# Patient Record
Sex: FEMALE | Race: WHITE | NOT HISPANIC OR LATINO | Employment: OTHER | URBAN - NONMETROPOLITAN AREA
[De-identification: names, ages, dates, MRNs, and addresses within clinical notes are randomized per-mention and may not be internally consistent; named-entity substitution may affect disease eponyms.]

---

## 2017-03-01 ENCOUNTER — OFFICE VISIT (OUTPATIENT)
Dept: FAMILY MEDICINE CLINIC | Facility: CLINIC | Age: 77
End: 2017-03-01

## 2017-03-01 VITALS
WEIGHT: 152 LBS | SYSTOLIC BLOOD PRESSURE: 112 MMHG | DIASTOLIC BLOOD PRESSURE: 58 MMHG | TEMPERATURE: 98.3 F | HEART RATE: 80 BPM | HEIGHT: 63 IN | BODY MASS INDEX: 26.93 KG/M2 | RESPIRATION RATE: 16 BRPM | OXYGEN SATURATION: 98 %

## 2017-03-01 DIAGNOSIS — J32.9 SINUSITIS, UNSPECIFIED CHRONICITY, UNSPECIFIED LOCATION: Primary | ICD-10-CM

## 2017-03-01 PROCEDURE — 99213 OFFICE O/P EST LOW 20 MIN: CPT | Performed by: FAMILY MEDICINE

## 2017-03-01 PROCEDURE — 96372 THER/PROPH/DIAG INJ SC/IM: CPT | Performed by: FAMILY MEDICINE

## 2017-03-01 RX ORDER — DEXAMETHASONE SODIUM PHOSPHATE 10 MG/ML
10 INJECTION INTRAMUSCULAR; INTRAVENOUS ONCE
Status: DISCONTINUED | OUTPATIENT
Start: 2017-03-01 | End: 2017-03-01

## 2017-03-01 RX ORDER — CEFDINIR 300 MG/1
300 CAPSULE ORAL 2 TIMES DAILY
Qty: 20 CAPSULE | Refills: 0 | Status: SHIPPED | OUTPATIENT
Start: 2017-03-01 | End: 2017-12-01

## 2017-03-01 RX ORDER — DEXAMETHASONE SODIUM PHOSPHATE 10 MG/ML
10 INJECTION INTRAMUSCULAR; INTRAVENOUS ONCE
Status: COMPLETED | OUTPATIENT
Start: 2017-03-01 | End: 2017-03-01

## 2017-03-01 RX ORDER — VALACYCLOVIR HYDROCHLORIDE 1 G/1
1000 TABLET, FILM COATED ORAL 2 TIMES DAILY
Qty: 30 TABLET | Refills: 0 | Status: SHIPPED | OUTPATIENT
Start: 2017-03-01 | End: 2017-12-01

## 2017-03-01 RX ADMIN — DEXAMETHASONE SODIUM PHOSPHATE 10 MG: 10 INJECTION INTRAMUSCULAR; INTRAVENOUS at 14:28

## 2017-03-01 NOTE — PROGRESS NOTES
Subjective   Jennifer Porter is a 76 y.o. female.   Chief Complaint   Patient presents with   • URI     been going on about 1 month    • Slow Heart Rate     heart rate ran in the 40's all last week        URI    This is a new problem. The current episode started 1 to 4 weeks ago. The problem has been waxing and waning. Maximum temperature: subj. Associated symptoms include congestion, coughing, a plugged ear sensation, rhinorrhea and a sore throat. She has tried nothing for the symptoms.        The following portions of the patient's history were reviewed and updated as appropriate: allergies, current medications, past family history, past medical history, past social history, past surgical history and problem list.    Review of Systems   Constitutional: Negative.    HENT: Positive for congestion, rhinorrhea and sore throat.    Eyes: Negative.    Respiratory: Positive for cough.    Cardiovascular: Negative.    Gastrointestinal: Negative.    Endocrine: Negative.    Genitourinary: Negative.    Musculoskeletal: Negative.    Skin: Negative.    Allergic/Immunologic: Negative.    Neurological: Negative.    Hematological: Negative.    Psychiatric/Behavioral: Negative.    All other systems reviewed and are negative.      Objective   Physical Exam   Constitutional: She is oriented to person, place, and time. She appears well-developed and well-nourished.   HENT:   Head: Normocephalic and atraumatic.   Right Ear: External ear normal.   Left Ear: External ear normal.   Nose: Mucosal edema and rhinorrhea present.   Mouth/Throat: Posterior oropharyngeal edema and posterior oropharyngeal erythema present.   Eyes: Conjunctivae and EOM are normal. Pupils are equal, round, and reactive to light.   Neck: Normal range of motion. Neck supple.   Cardiovascular: Normal rate, regular rhythm, normal heart sounds and intact distal pulses.  Exam reveals no gallop and no friction rub.    No murmur heard.  Pulmonary/Chest: Effort normal  and breath sounds normal. She has no wheezes. She has no rales.   Abdominal: Soft. Bowel sounds are normal. She exhibits no mass. There is no tenderness. There is no rebound and no guarding.   Musculoskeletal: Normal range of motion.   Neurological: She is alert and oriented to person, place, and time. She has normal reflexes. No cranial nerve deficit. She exhibits normal muscle tone.   Skin: Skin is warm and dry. No rash noted.   Psychiatric: She has a normal mood and affect. Her behavior is normal. Judgment and thought content normal.   Nursing note and vitals reviewed.      Assessment/Plan   Jennifer was seen today for uri and slow heart rate.    Diagnoses and all orders for this visit:    Sinusitis, unspecified chronicity, unspecified location  -     dexamethasone (DECADRON) injection 10 mg; Infuse 1 mL into a venous catheter 1 (One) Time.    Other orders  -     cefdinir (OMNICEF) 300 MG capsule; Take 1 capsule by mouth 2 (Two) Times a Day.  -     valACYclovir (VALTREX) 1000 MG tablet; Take 1 tablet by mouth 2 (Two) Times a Day.

## 2017-11-06 RX ORDER — BENAZEPRIL HYDROCHLORIDE 10 MG/1
TABLET ORAL
Qty: 30 TABLET | Refills: 0 | Status: SHIPPED | OUTPATIENT
Start: 2017-11-06 | End: 2017-12-01 | Stop reason: SDUPTHER

## 2017-11-06 RX ORDER — BISOPROLOL FUMARATE 5 MG/1
TABLET, FILM COATED ORAL
Qty: 30 TABLET | Refills: 0 | Status: SHIPPED | OUTPATIENT
Start: 2017-11-06 | End: 2017-12-01 | Stop reason: SDUPTHER

## 2017-11-06 RX ORDER — AMLODIPINE BESYLATE 10 MG/1
TABLET ORAL
Qty: 30 TABLET | Refills: 0 | Status: SHIPPED | OUTPATIENT
Start: 2017-11-06 | End: 2017-12-01 | Stop reason: SDUPTHER

## 2017-12-01 ENCOUNTER — OFFICE VISIT (OUTPATIENT)
Dept: FAMILY MEDICINE CLINIC | Facility: CLINIC | Age: 77
End: 2017-12-01

## 2017-12-01 ENCOUNTER — LAB (OUTPATIENT)
Dept: LAB | Facility: OTHER | Age: 77
End: 2017-12-01

## 2017-12-01 VITALS
SYSTOLIC BLOOD PRESSURE: 162 MMHG | TEMPERATURE: 97.6 F | WEIGHT: 150 LBS | HEIGHT: 63 IN | BODY MASS INDEX: 26.58 KG/M2 | DIASTOLIC BLOOD PRESSURE: 80 MMHG | RESPIRATION RATE: 20 BRPM | OXYGEN SATURATION: 98 % | HEART RATE: 88 BPM

## 2017-12-01 DIAGNOSIS — R73.9 HYPERGLYCEMIA: ICD-10-CM

## 2017-12-01 DIAGNOSIS — F32.9 REACTIVE DEPRESSION: ICD-10-CM

## 2017-12-01 DIAGNOSIS — I10 ESSENTIAL HYPERTENSION: ICD-10-CM

## 2017-12-01 DIAGNOSIS — I10 ESSENTIAL HYPERTENSION: Primary | ICD-10-CM

## 2017-12-01 DIAGNOSIS — E78.5 HYPERLIPIDEMIA, UNSPECIFIED HYPERLIPIDEMIA TYPE: ICD-10-CM

## 2017-12-01 DIAGNOSIS — D64.9 ANEMIA, UNSPECIFIED TYPE: ICD-10-CM

## 2017-12-01 LAB
ALBUMIN SERPL-MCNC: 4 G/DL (ref 3.2–5.5)
ALBUMIN/GLOB SERPL: 1.1 G/DL (ref 1–3)
ALP SERPL-CCNC: 52 U/L (ref 15–121)
ALT SERPL W P-5'-P-CCNC: 16 U/L (ref 10–60)
ANION GAP SERPL CALCULATED.3IONS-SCNC: 10 MMOL/L (ref 5–15)
AST SERPL-CCNC: 23 U/L (ref 10–60)
BASOPHILS # BLD AUTO: 0.03 10*3/MM3 (ref 0–0.2)
BASOPHILS NFR BLD AUTO: 0.4 % (ref 0–2)
BILIRUB SERPL-MCNC: 0.8 MG/DL (ref 0.2–1)
BUN BLD-MCNC: 20 MG/DL (ref 8–25)
BUN/CREAT SERPL: 14.3 (ref 7–25)
CALCIUM SPEC-SCNC: 9.2 MG/DL (ref 8.4–10.8)
CHLORIDE SERPL-SCNC: 106 MMOL/L (ref 100–112)
CHOLEST SERPL-MCNC: 206 MG/DL (ref 150–200)
CO2 SERPL-SCNC: 25 MMOL/L (ref 20–32)
CREAT BLD-MCNC: 1.4 MG/DL (ref 0.4–1.3)
DEPRECATED RDW RBC AUTO: 41.9 FL (ref 36.4–46.3)
EOSINOPHIL # BLD AUTO: 0.17 10*3/MM3 (ref 0–0.7)
EOSINOPHIL NFR BLD AUTO: 2.5 % (ref 0–7)
ERYTHROCYTE [DISTWIDTH] IN BLOOD BY AUTOMATED COUNT: 12.6 % (ref 11.5–14.5)
GFR SERPL CREATININE-BSD FRML MDRD: 37 ML/MIN/1.73 (ref 39–90)
GLOBULIN UR ELPH-MCNC: 3.8 GM/DL (ref 2.5–4.6)
GLUCOSE BLD-MCNC: 111 MG/DL (ref 70–100)
HCT VFR BLD AUTO: 39 % (ref 35–45)
HDLC SERPL-MCNC: 57 MG/DL (ref 35–100)
HGB BLD-MCNC: 12.6 G/DL (ref 12–15.5)
LDLC SERPL CALC-MCNC: 126 MG/DL
LDLC/HDLC SERPL: 2.21 {RATIO}
LYMPHOCYTES # BLD AUTO: 1.94 10*3/MM3 (ref 0.6–4.2)
LYMPHOCYTES NFR BLD AUTO: 28.9 % (ref 10–50)
MCH RBC QN AUTO: 30.1 PG (ref 26.5–34)
MCHC RBC AUTO-ENTMCNC: 32.3 G/DL (ref 31.4–36)
MCV RBC AUTO: 93.3 FL (ref 80–98)
MONOCYTES # BLD AUTO: 0.78 10*3/MM3 (ref 0–0.9)
MONOCYTES NFR BLD AUTO: 11.6 % (ref 0–12)
NEUTROPHILS # BLD AUTO: 3.79 10*3/MM3 (ref 2–8.6)
NEUTROPHILS NFR BLD AUTO: 56.6 % (ref 37–80)
PLATELET # BLD AUTO: 314 10*3/MM3 (ref 150–450)
PMV BLD AUTO: 9.3 FL (ref 8–12)
POTASSIUM BLD-SCNC: 4.3 MMOL/L (ref 3.4–5.4)
PROT SERPL-MCNC: 7.8 G/DL (ref 6.7–8.2)
RBC # BLD AUTO: 4.18 10*6/MM3 (ref 3.77–5.16)
SODIUM BLD-SCNC: 141 MMOL/L (ref 134–146)
TRIGL SERPL-MCNC: 114 MG/DL (ref 35–160)
TSH SERPL DL<=0.05 MIU/L-ACNC: 6.11 MIU/ML (ref 0.46–4.68)
VLDLC SERPL-MCNC: 22.8 MG/DL
WBC NRBC COR # BLD: 6.71 10*3/MM3 (ref 3.2–9.8)

## 2017-12-01 PROCEDURE — 80053 COMPREHEN METABOLIC PANEL: CPT | Performed by: FAMILY MEDICINE

## 2017-12-01 PROCEDURE — 84443 ASSAY THYROID STIM HORMONE: CPT | Performed by: FAMILY MEDICINE

## 2017-12-01 PROCEDURE — 36415 COLL VENOUS BLD VENIPUNCTURE: CPT | Performed by: FAMILY MEDICINE

## 2017-12-01 PROCEDURE — 85025 COMPLETE CBC W/AUTO DIFF WBC: CPT | Performed by: FAMILY MEDICINE

## 2017-12-01 PROCEDURE — 99214 OFFICE O/P EST MOD 30 MIN: CPT | Performed by: FAMILY MEDICINE

## 2017-12-01 PROCEDURE — 80061 LIPID PANEL: CPT | Performed by: FAMILY MEDICINE

## 2017-12-01 RX ORDER — BENAZEPRIL HYDROCHLORIDE 10 MG/1
10 TABLET ORAL DAILY
Qty: 90 TABLET | Refills: 1 | Status: SHIPPED | OUTPATIENT
Start: 2017-12-01 | End: 2018-07-20 | Stop reason: SDUPTHER

## 2017-12-01 RX ORDER — BISOPROLOL FUMARATE 5 MG/1
5 TABLET, FILM COATED ORAL DAILY
Qty: 90 TABLET | Refills: 1 | Status: SHIPPED | OUTPATIENT
Start: 2017-12-01 | End: 2018-07-20 | Stop reason: SDUPTHER

## 2017-12-01 RX ORDER — AMLODIPINE BESYLATE 10 MG/1
10 TABLET ORAL DAILY
Qty: 90 TABLET | Refills: 1 | Status: SHIPPED | OUTPATIENT
Start: 2017-12-01 | End: 2018-07-20 | Stop reason: SDUPTHER

## 2017-12-01 RX ORDER — SERTRALINE HYDROCHLORIDE 25 MG/1
25 TABLET, FILM COATED ORAL DAILY
Qty: 30 TABLET | Refills: 6 | Status: SHIPPED | OUTPATIENT
Start: 2017-12-01 | End: 2018-07-20 | Stop reason: SINTOL

## 2017-12-01 RX ORDER — CETIRIZINE HYDROCHLORIDE 10 MG/1
10 TABLET ORAL DAILY
Qty: 30 TABLET | Refills: 3 | Status: SHIPPED | OUTPATIENT
Start: 2017-12-01 | End: 2018-07-20 | Stop reason: SDUPTHER

## 2017-12-01 NOTE — PROGRESS NOTES
Subjective   Jennifer Porter is a 77 y.o. female.   Chief Complaint   Patient presents with   • Hypertension     needs refills        Hypertension   This is a chronic problem. The current episode started more than 1 year ago. The problem is unchanged. The problem is controlled. Associated symptoms include anxiety and malaise/fatigue. Pertinent negatives include no blurred vision, chest pain or headaches. There are no associated agents to hypertension. Risk factors for coronary artery disease include dyslipidemia and sedentary lifestyle. Past treatments include ACE inhibitors, beta blockers and calcium channel blockers. The current treatment provides significant improvement. Compliance problems: ran out of meds.         The following portions of the patient's history were reviewed and updated as appropriate: allergies, current medications, past family history, past medical history, past social history, past surgical history and problem list.    Review of Systems   Constitutional: Positive for fatigue and malaise/fatigue.   HENT: Positive for congestion.    Eyes: Negative.  Negative for blurred vision.   Respiratory: Negative.  Negative for chest tightness.    Cardiovascular: Negative.  Negative for chest pain.   Gastrointestinal: Negative.    Endocrine: Negative.    Genitourinary: Negative.    Musculoskeletal: Negative.    Skin: Negative.    Allergic/Immunologic: Negative.    Neurological: Positive for dizziness. Negative for headaches.   Hematological: Negative.    Psychiatric/Behavioral: Positive for dysphoric mood. The patient is nervous/anxious.    All other systems reviewed and are negative.      Objective   Physical Exam   Constitutional: She is oriented to person, place, and time. She appears well-developed and well-nourished.   HENT:   Head: Normocephalic and atraumatic.   Right Ear: External ear normal.   Left Ear: External ear normal.   Nose: Mucosal edema and rhinorrhea present.   Mouth/Throat:  Posterior oropharyngeal edema and posterior oropharyngeal erythema present.   Eyes: Conjunctivae and EOM are normal. Pupils are equal, round, and reactive to light.   Neck: Normal range of motion. Neck supple.   Cardiovascular: Normal rate, regular rhythm, normal heart sounds and intact distal pulses.  Exam reveals no gallop and no friction rub.    No murmur heard.  Pulmonary/Chest: Effort normal and breath sounds normal. She has no wheezes. She has no rales.   Abdominal: Soft. Bowel sounds are normal. She exhibits no mass. There is no tenderness. There is no rebound and no guarding.   Musculoskeletal: Normal range of motion.   Neurological: She is alert and oriented to person, place, and time. She has normal reflexes. No cranial nerve deficit. She exhibits normal muscle tone.   Skin: Skin is warm and dry. No rash noted.   Psychiatric: Her behavior is normal. Judgment and thought content normal. Her mood appears anxious.   Nursing note and vitals reviewed.      Assessment/Plan   Jennifer was seen today for hypertension.    Diagnoses and all orders for this visit:    Essential hypertension  -     CBC & Differential; Future  -     Comprehensive Metabolic Panel; Future  -     Lipid Panel; Future  -     TSH; Future    Hyperlipidemia, unspecified hyperlipidemia type    Hyperglycemia    Anemia, unspecified type    Reactive depression    Other orders  -     amLODIPine (NORVASC) 10 MG tablet; Take 1 tablet by mouth Daily.  -     benazepril (LOTENSIN) 10 MG tablet; Take 1 tablet by mouth Daily.  -     bisoprolol (ZEBeta) 5 MG tablet; Take 1 tablet by mouth Daily.  -     cetirizine (zyrTEC) 10 MG tablet; Take 1 tablet by mouth Daily.  -     sertraline (ZOLOFT) 25 MG tablet; Take 1 tablet by mouth Daily.

## 2017-12-06 ENCOUNTER — OFFICE VISIT (OUTPATIENT)
Dept: FAMILY MEDICINE CLINIC | Facility: CLINIC | Age: 77
End: 2017-12-06

## 2017-12-06 VITALS
HEART RATE: 65 BPM | DIASTOLIC BLOOD PRESSURE: 78 MMHG | TEMPERATURE: 97.6 F | OXYGEN SATURATION: 96 % | BODY MASS INDEX: 25.92 KG/M2 | RESPIRATION RATE: 16 BRPM | HEIGHT: 65 IN | SYSTOLIC BLOOD PRESSURE: 150 MMHG | WEIGHT: 155.6 LBS

## 2017-12-06 DIAGNOSIS — F32.9 REACTIVE DEPRESSION: ICD-10-CM

## 2017-12-06 DIAGNOSIS — E78.01 FAMILIAL HYPERCHOLESTEROLEMIA: ICD-10-CM

## 2017-12-06 DIAGNOSIS — R73.9 HYPERGLYCEMIA: ICD-10-CM

## 2017-12-06 DIAGNOSIS — R06.02 SHORTNESS OF BREATH: ICD-10-CM

## 2017-12-06 DIAGNOSIS — I49.9 CARDIAC ARRHYTHMIA, UNSPECIFIED CARDIAC ARRHYTHMIA TYPE: ICD-10-CM

## 2017-12-06 DIAGNOSIS — D64.9 ANEMIA, UNSPECIFIED TYPE: ICD-10-CM

## 2017-12-06 DIAGNOSIS — E03.9 ACQUIRED HYPOTHYROIDISM: ICD-10-CM

## 2017-12-06 DIAGNOSIS — I10 ESSENTIAL HYPERTENSION: Primary | ICD-10-CM

## 2017-12-06 PROCEDURE — 99215 OFFICE O/P EST HI 40 MIN: CPT | Performed by: FAMILY MEDICINE

## 2017-12-06 RX ORDER — LEVOTHYROXINE SODIUM 0.05 MG/1
50 TABLET ORAL DAILY
Qty: 30 TABLET | Refills: 6 | Status: SHIPPED | OUTPATIENT
Start: 2017-12-06 | End: 2018-07-20 | Stop reason: SDUPTHER

## 2017-12-06 NOTE — PROGRESS NOTES
Subjective   Jennifer Porter is a 77 y.o. female.   Chief Complaint   Patient presents with   • Hyperlipidemia     labs   • Hypertension     labs   • Hyperglycemia     labs       History of Present Illness   Patient with history of hypertension is in today for reevaluation and to review labs.  Patient was recently seen with adjustment of her medications and her lab work revealed an elevated TSH of 6.1.  Patient is continuing to complain of fatigue but has had multiple episodes of elevated heart rate with dizziness and weakness.  She is accompanied today by her daughter gives additional history.  Patient denies any chest pain, PND, orthopnea.  She has had no neurological symptoms.    The following portions of the patient's history were reviewed and updated as appropriate: allergies, current medications, past family history, past medical history, past social history, past surgical history and problem list.    Review of Systems   Constitutional: Positive for activity change and fatigue.   HENT: Negative.    Eyes: Negative.    Respiratory: Positive for shortness of breath.    Cardiovascular: Positive for palpitations.   Gastrointestinal: Negative.    Endocrine: Negative.    Genitourinary: Negative.    Musculoskeletal: Positive for arthralgias and back pain.   Skin: Negative.    Allergic/Immunologic: Negative.    Neurological: Positive for dizziness and weakness.   Hematological: Negative.    Psychiatric/Behavioral: Negative.    All other systems reviewed and are negative.      Objective   Physical Exam   Constitutional: She is oriented to person, place, and time. She appears well-developed and well-nourished.   HENT:   Head: Normocephalic and atraumatic.   Right Ear: External ear normal.   Left Ear: External ear normal.   Nose: Nose normal.   Mouth/Throat: Oropharynx is clear and moist.   Eyes: Conjunctivae and EOM are normal. Pupils are equal, round, and reactive to light.   Neck: Normal range of motion. Neck  supple.   Cardiovascular: Normal rate, regular rhythm, normal heart sounds and intact distal pulses.  Exam reveals no gallop and no friction rub.    No murmur heard.  Pulmonary/Chest: Effort normal and breath sounds normal. She has no wheezes. She has no rales.   Abdominal: Soft. Bowel sounds are normal. She exhibits no mass. There is no tenderness. There is no rebound and no guarding.   Musculoskeletal: Normal range of motion.   Neurological: She is alert and oriented to person, place, and time. She has normal reflexes. No cranial nerve deficit. She exhibits normal muscle tone.   Skin: Skin is warm and dry. No rash noted.   Psychiatric: She has a normal mood and affect. Her behavior is normal. Judgment and thought content normal.   Nursing note and vitals reviewed.      Assessment/Plan   Jennifer was seen today for hyperlipidemia, hypertension and hyperglycemia.    Diagnoses and all orders for this visit:    Essential hypertension    Familial hypercholesterolemia    Hyperglycemia    Reactive depression    Anemia, unspecified type    Acquired hypothyroidism  -     TSH; Future  -     T4, Free; Future    Cardiac arrhythmia, unspecified cardiac arrhythmia type  -     Holter monitor - 48 hour; Future  -     Adult Transthoracic Echo Complete W/ Cont if Necessary Per Protocol; Future    Shortness of breath   -     Adult Transthoracic Echo Complete W/ Cont if Necessary Per Protocol; Future    Other orders  -     levothyroxine (SYNTHROID) 50 MCG tablet; Take 1 tablet by mouth Daily.        No adjustment in her hypertensive agent at this time until we get the thyroid stabilized.

## 2018-01-09 ENCOUNTER — LAB (OUTPATIENT)
Dept: LAB | Facility: OTHER | Age: 78
End: 2018-01-09

## 2018-01-09 DIAGNOSIS — E03.9 ACQUIRED HYPOTHYROIDISM: ICD-10-CM

## 2018-01-09 LAB — TSH SERPL DL<=0.05 MIU/L-ACNC: 1.13 MIU/ML (ref 0.46–4.68)

## 2018-01-09 PROCEDURE — 84443 ASSAY THYROID STIM HORMONE: CPT | Performed by: FAMILY MEDICINE

## 2018-01-09 PROCEDURE — 36415 COLL VENOUS BLD VENIPUNCTURE: CPT | Performed by: FAMILY MEDICINE

## 2018-01-19 ENCOUNTER — OFFICE VISIT (OUTPATIENT)
Dept: FAMILY MEDICINE CLINIC | Facility: CLINIC | Age: 78
End: 2018-01-19

## 2018-01-19 VITALS
DIASTOLIC BLOOD PRESSURE: 78 MMHG | OXYGEN SATURATION: 94 % | HEIGHT: 65 IN | BODY MASS INDEX: 24.62 KG/M2 | RESPIRATION RATE: 14 BRPM | SYSTOLIC BLOOD PRESSURE: 122 MMHG | TEMPERATURE: 96.8 F | HEART RATE: 68 BPM | WEIGHT: 147.8 LBS

## 2018-01-19 DIAGNOSIS — E78.01 FAMILIAL HYPERCHOLESTEROLEMIA: ICD-10-CM

## 2018-01-19 DIAGNOSIS — E03.9 ACQUIRED HYPOTHYROIDISM: Primary | ICD-10-CM

## 2018-01-19 DIAGNOSIS — I47.1 SVT (SUPRAVENTRICULAR TACHYCARDIA) (HCC): ICD-10-CM

## 2018-01-19 DIAGNOSIS — I10 ESSENTIAL HYPERTENSION: ICD-10-CM

## 2018-01-19 DIAGNOSIS — I51.89 DIASTOLIC DYSFUNCTION: ICD-10-CM

## 2018-01-19 DIAGNOSIS — I10 ESSENTIAL (PRIMARY) HYPERTENSION: ICD-10-CM

## 2018-01-19 PROCEDURE — 99214 OFFICE O/P EST MOD 30 MIN: CPT | Performed by: FAMILY MEDICINE

## 2018-01-19 NOTE — PROGRESS NOTES
Subjective   Jennifer Porter is a 77 y.o. female who presents to the office for follow-up and review of labs.     History of Present Illness   Patient is in today for reevaluation of hypothyroidism and palpitations.   states she is feeling better since the introduction of the thyroid replacement.  Energy is starting to improve.  She is tolerating medication.  Additional history is obtained from daughter.    The following portions of the patient's history were reviewed and updated as appropriate: allergies, current medications, past family history, past medical history, past social history, past surgical history and problem list.    Review of Systems   HENT: Negative.    Eyes: Negative.    Respiratory: Negative.    Cardiovascular: Positive for palpitations.   Gastrointestinal: Negative.    Endocrine: Negative.    Genitourinary: Negative.    Musculoskeletal: Positive for arthralgias and back pain.   Skin: Negative.    Allergic/Immunologic: Negative.    Neurological: Positive for dizziness and weakness.   Hematological: Negative.    Psychiatric/Behavioral: Negative.    All other systems reviewed and are negative.      Objective   Physical Exam   Constitutional: She is oriented to person, place, and time. She appears well-developed and well-nourished.   HENT:   Head: Normocephalic and atraumatic.   Right Ear: External ear normal.   Left Ear: External ear normal.   Nose: Nose normal.   Mouth/Throat: Oropharynx is clear and moist.   Eyes: Conjunctivae and EOM are normal. Pupils are equal, round, and reactive to light.   Neck: Normal range of motion. Neck supple.   Cardiovascular: Normal rate, regular rhythm, normal heart sounds and intact distal pulses.  Exam reveals no gallop and no friction rub.    No murmur heard.  Pulmonary/Chest: Effort normal and breath sounds normal. She has no wheezes. She has no rales.   Abdominal: Soft. Bowel sounds are normal. She exhibits no mass. There is no tenderness. There is no  rebound and no guarding.   Musculoskeletal: Normal range of motion.   Neurological: She is alert and oriented to person, place, and time. She has normal reflexes. No cranial nerve deficit. She exhibits normal muscle tone.   Skin: Skin is warm and dry. No rash noted.   Psychiatric: She has a normal mood and affect. Her behavior is normal. Judgment and thought content normal.   Nursing note and vitals reviewed.  Echo reviewed    Assessment/Plan   Jennifer was seen today for hypothyroidism.    Diagnoses and all orders for this visit:    Acquired hypothyroidism  -     TSH; Future  -     T4, Free; Future    Essential hypertension    Familial hypercholesterolemia    Diastolic dysfunction    SVT (supraventricular tachycardia)  -     Comprehensive Metabolic Panel; Future  -     LDL Cholesterol, Direct; Future  -     TSH; Future  -     T4, Free; Future    Essential (primary) hypertension   -     LDL Cholesterol, Direct; Future    Continue current therapies     Labs are reviewed with patient.    PHQ-2/PHQ-9 Depression Screening 12/1/2017   Little interest or pleasure in doing things 1   Feeling down, depressed, or hopeless 0   Total Score 1         Lab on 01/09/2018   Component Date Value Ref Range Status   • TSH 01/09/2018 1.130  0.460 - 4.680 mIU/mL Final   Hospital Outpatient Visit on 12/28/2017   Component Date Value Ref Range Status   • BSA 12/28/2017 1.8  m^2 Preliminary   • Ao root diam 12/28/2017 3.3  cm Preliminary   • Ao root area 12/28/2017 8.3  cm^2 Preliminary   • ACS 12/28/2017 1.5  cm Preliminary   • LA dimension 12/28/2017 4.4  cm Preliminary   • LA/Ao 12/28/2017 1.4   Preliminary   • LVOT diam 12/28/2017 2.7  cm Preliminary   • LVOT area 12/28/2017 5.7  cm^2 Preliminary   • LVOT area(traced) 12/28/2017 5.7  cm^2 Preliminary   • LVLd ap4 12/28/2017 6.6  cm Preliminary   • EDV(MOD-sp4) 12/28/2017 111.0  ml Preliminary   • LVLs ap4 12/28/2017 6.1  cm Preliminary   • ESV(MOD-sp4) 12/28/2017 51.0  ml Preliminary    • EF(MOD-sp4) 12/28/2017 55  % Preliminary   • SV(MOD-sp4) 12/28/2017 60.0  ml Preliminary   • SI(MOD-sp4) 12/28/2017 33.8  ml/m^2 Preliminary   • Ao root area (BSA corrected) 12/28/2017 1.8   Preliminary   • CONTRAST EF 4CH 12/28/2017 54.1  ml/m^2 Preliminary   • LV Diastolic corrected for BSA 12/28/2017 62.5  ml/m^2 Preliminary   • LV Systolic corrected for BSA 12/28/2017 28.7  ml/m^2 Preliminary   • MV E max jerson 12/28/2017 92.2  cm/sec Preliminary   • MV A max jerson 12/28/2017 43.2  cm/sec Preliminary   • MV E/A 12/28/2017 2.1   Preliminary   • MV dec slope 12/28/2017 458.0  cm/sec^2 Preliminary   • Ao pk jerson 12/28/2017 135.0  cm/sec Preliminary   • Ao max PG 12/28/2017 7.3  mmHg Preliminary   • Ao max PG (full) 12/28/2017 4.2  mmHg Preliminary   • Ao V2 mean 12/28/2017 99.4  cm/sec Preliminary   • Ao mean PG 12/28/2017 4.0  mmHg Preliminary   • Ao mean PG (full) 12/28/2017 3.0  mmHg Preliminary   • Ao V2 VTI 12/28/2017 32.1  cm Preliminary   • EL(I,A) 12/28/2017 3.7  cm^2 Preliminary   • EL(I,D) 12/28/2017 3.7  cm^2 Preliminary   • EL(V,A) 12/28/2017 3.7  cm^2 Preliminary   • EL(V,D) 12/28/2017 3.7  cm^2 Preliminary   • LV V1 max PG 12/28/2017 3.1  mmHg Preliminary   • LV V1 mean PG 12/28/2017 1.0  mmHg Preliminary   • LV V1 max 12/28/2017 87.9  cm/sec Preliminary   • LV V1 mean 12/28/2017 46.3  cm/sec Preliminary   • LV V1 VTI 12/28/2017 20.8  cm Preliminary   • MR max jerson 12/28/2017 289.0  cm/sec Preliminary   • MR max PG 12/28/2017 33.4  mmHg Preliminary   • SV(Ao) 12/28/2017 266.3  ml Preliminary   • SI(Ao) 12/28/2017 150.0  ml/m^2 Preliminary   • SV(LVOT) 12/28/2017 119.1  ml Preliminary   • SI(LVOT) 12/28/2017 67.1  ml/m^2 Preliminary   • PA V2 max 12/28/2017 63.5  cm/sec Preliminary   • PA max PG 12/28/2017 1.7  mmHg Preliminary   • PA V2 mean 12/28/2017 32.3  cm/sec Preliminary   • PA mean PG 12/28/2017 1.0  mmHg Preliminary   • PA V2 VTI 12/28/2017 12.1  cm Preliminary   • TR max jerson 12/28/2017  195.5  cm/sec Preliminary   • RVSP(TR) 12/28/2017 25.7  mmHg Preliminary   • RAP systole 12/28/2017 10.0  mmHg Preliminary   • Pulm Sys Tab 12/28/2017 43.2  cm/sec Preliminary   • Pulm Freeman Tab 12/28/2017 32.0  cm/sec Preliminary   • Pulm S/D 12/28/2017 1.4   Preliminary   • BH CV ECHO DARON - BZI_BMI 12/28/2017 25.8  kilograms/m^2 Preliminary   • BH CV ECHO DARON - BSA([place]) 12/28/2017 1.8  m^2 Preliminary   • BH CV ECHO DARON - BZI_METRIC_WEIGHT 12/28/2017 70.3  kg Preliminary   •  CV ECHO DARON - BZI_METRIC_HEIGHT 12/28/2017 165.1  cm Preliminary   • Target HR (85%) 12/28/2017 123  bpm Final   • Max. Pred. HR (100%) 12/28/2017 145  bpm Final   • Echo EF Estimated 12/28/2017 50  % Final   Lab on 12/01/2017   Component Date Value Ref Range Status   • Glucose 12/01/2017 111* 70 - 100 mg/dL Final   • BUN 12/01/2017 20  8 - 25 mg/dL Final   • Creatinine 12/01/2017 1.40* 0.40 - 1.30 mg/dL Final   • Sodium 12/01/2017 141  134 - 146 mmol/L Final   • Potassium 12/01/2017 4.3  3.4 - 5.4 mmol/L Final   • Chloride 12/01/2017 106  100 - 112 mmol/L Final   • CO2 12/01/2017 25.0  20.0 - 32.0 mmol/L Final   • Calcium 12/01/2017 9.2  8.4 - 10.8 mg/dL Final   • Total Protein 12/01/2017 7.8  6.7 - 8.2 g/dL Final   • Albumin 12/01/2017 4.00  3.20 - 5.50 g/dL Final   • ALT (SGPT) 12/01/2017 16  10 - 60 U/L Final   • AST (SGOT) 12/01/2017 23  10 - 60 U/L Final   • Alkaline Phosphatase 12/01/2017 52  15 - 121 U/L Final   • Total Bilirubin 12/01/2017 0.8  0.2 - 1.0 mg/dL Final   • eGFR Non African Amer 12/01/2017 37* 39 - 90 mL/min/1.73 Final   • Globulin 12/01/2017 3.8  2.5 - 4.6 gm/dL Final   • A/G Ratio 12/01/2017 1.1  1.0 - 3.0 g/dL Final   • BUN/Creatinine Ratio 12/01/2017 14.3  7.0 - 25.0 Final   • Anion Gap 12/01/2017 10.0  5.0 - 15.0 mmol/L Final   • Total Cholesterol 12/01/2017 206* 150 - 200 mg/dL Final   • Triglycerides 12/01/2017 114  35 - 160 mg/dL Final   • HDL Cholesterol 12/01/2017 57  35 - 100 mg/dL Final   • LDL  Cholesterol  12/01/2017 126  mg/dL Final   • VLDL Cholesterol 12/01/2017 22.8  mg/dL Final   • LDL/HDL Ratio 12/01/2017 2.21   Final   • TSH 12/01/2017 6.110* 0.460 - 4.680 mIU/mL Final   • WBC 12/01/2017 6.71  3.20 - 9.80 10*3/mm3 Final   • RBC 12/01/2017 4.18  3.77 - 5.16 10*6/mm3 Final   • Hemoglobin 12/01/2017 12.6  12.0 - 15.5 g/dL Final   • Hematocrit 12/01/2017 39.0  35.0 - 45.0 % Final   • MCV 12/01/2017 93.3  80.0 - 98.0 fL Final   • MCH 12/01/2017 30.1  26.5 - 34.0 pg Final   • MCHC 12/01/2017 32.3  31.4 - 36.0 g/dL Final   • RDW 12/01/2017 12.6  11.5 - 14.5 % Final   • RDW-SD 12/01/2017 41.9  36.4 - 46.3 fl Final   • MPV 12/01/2017 9.3  8.0 - 12.0 fL Final   • Platelets 12/01/2017 314  150 - 450 10*3/mm3 Final   • Neutrophil % 12/01/2017 56.6  37.0 - 80.0 % Final   • Lymphocyte % 12/01/2017 28.9  10.0 - 50.0 % Final   • Monocyte % 12/01/2017 11.6  0.0 - 12.0 % Final   • Eosinophil % 12/01/2017 2.5  0.0 - 7.0 % Final   • Basophil % 12/01/2017 0.4  0.0 - 2.0 % Final   • Neutrophils, Absolute 12/01/2017 3.79  2.00 - 8.60 10*3/mm3 Final   • Lymphocytes, Absolute 12/01/2017 1.94  0.60 - 4.20 10*3/mm3 Final   • Monocytes, Absolute 12/01/2017 0.78  0.00 - 0.90 10*3/mm3 Final   • Eosinophils, Absolute 12/01/2017 0.17  0.00 - 0.70 10*3/mm3 Final   • Basophils, Absolute 12/01/2017 0.03  0.00 - 0.20 10*3/mm3 Final   ]

## 2018-07-09 ENCOUNTER — LAB (OUTPATIENT)
Dept: LAB | Facility: OTHER | Age: 78
End: 2018-07-09

## 2018-07-09 DIAGNOSIS — I10 ESSENTIAL (PRIMARY) HYPERTENSION: ICD-10-CM

## 2018-07-09 DIAGNOSIS — E03.9 ACQUIRED HYPOTHYROIDISM: ICD-10-CM

## 2018-07-09 DIAGNOSIS — I47.1 SVT (SUPRAVENTRICULAR TACHYCARDIA) (HCC): ICD-10-CM

## 2018-07-09 LAB
ALBUMIN SERPL-MCNC: 4.6 G/DL (ref 3.5–5)
ALBUMIN/GLOB SERPL: 1.4 G/DL (ref 1.1–1.8)
ALP SERPL-CCNC: 49 U/L (ref 38–126)
ALT SERPL W P-5'-P-CCNC: 18 U/L
ANION GAP SERPL CALCULATED.3IONS-SCNC: 8 MMOL/L (ref 5–15)
ARTICHOKE IGE QN: 113 MG/DL (ref 1–129)
AST SERPL-CCNC: 25 U/L (ref 14–36)
BILIRUB SERPL-MCNC: 0.6 MG/DL (ref 0.2–1.3)
BUN BLD-MCNC: 20 MG/DL (ref 7–17)
BUN/CREAT SERPL: 15.3 (ref 7–25)
CALCIUM SPEC-SCNC: 9.2 MG/DL (ref 8.4–10.2)
CHLORIDE SERPL-SCNC: 109 MMOL/L (ref 98–107)
CO2 SERPL-SCNC: 25 MMOL/L (ref 22–30)
CREAT BLD-MCNC: 1.31 MG/DL (ref 0.52–1.04)
GFR SERPL CREATININE-BSD FRML MDRD: 40 ML/MIN/1.73 (ref 39–90)
GLOBULIN UR ELPH-MCNC: 3.4 GM/DL (ref 2.3–3.5)
GLUCOSE BLD-MCNC: 114 MG/DL (ref 74–99)
POTASSIUM BLD-SCNC: 4.3 MMOL/L (ref 3.4–5)
PROT SERPL-MCNC: 8 G/DL (ref 6.3–8.2)
SODIUM BLD-SCNC: 142 MMOL/L (ref 137–145)
T4 FREE SERPL-MCNC: 1.16 NG/DL (ref 0.78–2.19)
TSH SERPL DL<=0.05 MIU/L-ACNC: 3.91 MIU/ML (ref 0.46–4.68)

## 2018-07-09 PROCEDURE — 36415 COLL VENOUS BLD VENIPUNCTURE: CPT | Performed by: FAMILY MEDICINE

## 2018-07-09 PROCEDURE — 80053 COMPREHEN METABOLIC PANEL: CPT | Performed by: FAMILY MEDICINE

## 2018-07-09 PROCEDURE — 84439 ASSAY OF FREE THYROXINE: CPT | Performed by: FAMILY MEDICINE

## 2018-07-09 PROCEDURE — 84443 ASSAY THYROID STIM HORMONE: CPT | Performed by: FAMILY MEDICINE

## 2018-07-09 PROCEDURE — 83721 ASSAY OF BLOOD LIPOPROTEIN: CPT | Performed by: FAMILY MEDICINE

## 2018-07-20 ENCOUNTER — OFFICE VISIT (OUTPATIENT)
Dept: FAMILY MEDICINE CLINIC | Facility: CLINIC | Age: 78
End: 2018-07-20

## 2018-07-20 VITALS
WEIGHT: 152 LBS | OXYGEN SATURATION: 98 % | SYSTOLIC BLOOD PRESSURE: 142 MMHG | DIASTOLIC BLOOD PRESSURE: 76 MMHG | TEMPERATURE: 97.9 F | BODY MASS INDEX: 25.33 KG/M2 | HEIGHT: 65 IN | HEART RATE: 60 BPM

## 2018-07-20 DIAGNOSIS — R73.9 HYPERGLYCEMIA: ICD-10-CM

## 2018-07-20 DIAGNOSIS — E78.01 FAMILIAL HYPERCHOLESTEROLEMIA: ICD-10-CM

## 2018-07-20 DIAGNOSIS — I51.89 DIASTOLIC DYSFUNCTION: ICD-10-CM

## 2018-07-20 DIAGNOSIS — I10 ESSENTIAL HYPERTENSION: Primary | ICD-10-CM

## 2018-07-20 DIAGNOSIS — I47.1 SVT (SUPRAVENTRICULAR TACHYCARDIA) (HCC): ICD-10-CM

## 2018-07-20 DIAGNOSIS — D64.9 ANEMIA, UNSPECIFIED TYPE: ICD-10-CM

## 2018-07-20 DIAGNOSIS — F32.9 REACTIVE DEPRESSION: ICD-10-CM

## 2018-07-20 DIAGNOSIS — E03.9 ACQUIRED HYPOTHYROIDISM: ICD-10-CM

## 2018-07-20 PROCEDURE — 99214 OFFICE O/P EST MOD 30 MIN: CPT | Performed by: FAMILY MEDICINE

## 2018-07-20 RX ORDER — AMLODIPINE BESYLATE 10 MG/1
10 TABLET ORAL DAILY
Qty: 90 TABLET | Refills: 1 | Status: SHIPPED | OUTPATIENT
Start: 2018-07-20

## 2018-07-20 RX ORDER — CETIRIZINE HYDROCHLORIDE 10 MG/1
10 TABLET ORAL DAILY
Qty: 90 TABLET | Refills: 1 | Status: SHIPPED | OUTPATIENT
Start: 2018-07-20

## 2018-07-20 RX ORDER — BISOPROLOL FUMARATE 5 MG/1
5 TABLET, FILM COATED ORAL DAILY
Qty: 90 TABLET | Refills: 1 | Status: SHIPPED | OUTPATIENT
Start: 2018-07-20

## 2018-07-20 RX ORDER — LEVOTHYROXINE SODIUM 0.05 MG/1
50 TABLET ORAL DAILY
Qty: 90 TABLET | Refills: 1 | Status: SHIPPED | OUTPATIENT
Start: 2018-07-20

## 2018-07-20 RX ORDER — BENAZEPRIL HYDROCHLORIDE 10 MG/1
10 TABLET ORAL DAILY
Qty: 90 TABLET | Refills: 1 | Status: SHIPPED | OUTPATIENT
Start: 2018-07-20

## 2018-07-20 NOTE — PROGRESS NOTES
Subjective   Jennifer Porter is a 77 y.o. female who presents to the office for follow-up and review of labs.     History of Present Illness   Patient with history of hypertension, hyperlipidemia, and hypothyroidism is in today for reevaluation and review labs.  Patient states she is doing well at this time and voices no acute complaints.  She was unable to tolerate the Zoloft and stopped that medication.  She does feel like her energy and other symptoms are improved with the addition of the thyroid replacement.    The following portions of the patient's history were reviewed and updated as appropriate: allergies, current medications, past family history, past medical history, past social history, past surgical history and problem list.    Review of Systems   HENT: Negative.    Eyes: Negative.    Respiratory: Negative.    Cardiovascular: Positive for palpitations.   Gastrointestinal: Negative.    Endocrine: Negative.    Genitourinary: Negative.    Musculoskeletal: Positive for arthralgias and back pain.   Skin: Negative.    Allergic/Immunologic: Negative.    Neurological: Positive for dizziness and weakness.   Hematological: Negative.    Psychiatric/Behavioral: Negative.    All other systems reviewed and are negative.      Objective   Physical Exam   Constitutional: She is oriented to person, place, and time. She appears well-developed and well-nourished.   HENT:   Head: Normocephalic and atraumatic.   Right Ear: External ear normal.   Left Ear: External ear normal.   Nose: Nose normal.   Mouth/Throat: Oropharynx is clear and moist.   Eyes: Pupils are equal, round, and reactive to light. Conjunctivae and EOM are normal.   Neck: Normal range of motion. Neck supple.   Cardiovascular: Normal rate, regular rhythm, normal heart sounds and intact distal pulses.  Exam reveals no gallop and no friction rub.    No murmur heard.  Pulmonary/Chest: Effort normal and breath sounds normal. She has no wheezes. She has no  rales.   Abdominal: Soft. Bowel sounds are normal. She exhibits no mass. There is no tenderness. There is no rebound and no guarding.   Musculoskeletal: Normal range of motion.        Right knee: Tenderness found.        Left knee: Tenderness found.   Neurological: She is alert and oriented to person, place, and time. She has normal reflexes. No cranial nerve deficit. She exhibits normal muscle tone.   Skin: Skin is warm and dry. No rash noted.   Psychiatric: She has a normal mood and affect. Her behavior is normal. Judgment and thought content normal.   Nursing note and vitals reviewed.      Assessment/Plan   Jennifer was seen today for hypertension and results.    Diagnoses and all orders for this visit:    Essential hypertension  -     CBC & Differential; Future  -     Comprehensive Metabolic Panel; Future    Familial hypercholesterolemia    Diastolic dysfunction    SVT (supraventricular tachycardia) (CMS/HCC)    Acquired hypothyroidism  -     TSH; Future  -     T4, Free; Future  -     Lipid Panel; Future    Anemia, unspecified type    Hyperglycemia    Reactive depression    Other orders  -     amLODIPine (NORVASC) 10 MG tablet; Take 1 tablet by mouth Daily.  -     benazepril (LOTENSIN) 10 MG tablet; Take 1 tablet by mouth Daily.  -     cetirizine (zyrTEC) 10 MG tablet; Take 1 tablet by mouth Daily.  -     bisoprolol (ZEBeta) 5 MG tablet; Take 1 tablet by mouth Daily.  -     levothyroxine (SYNTHROID) 50 MCG tablet; Take 1 tablet by mouth Daily.         Labs are reviewed with patient.    PHQ-2/PHQ-9 Depression Screening 7/20/2018   Little interest or pleasure in doing things 0   Feeling down, depressed, or hopeless 0   Total Score 0         Lab on 07/09/2018   Component Date Value Ref Range Status   • Glucose 07/09/2018 114* 74 - 99 mg/dL Final   • BUN 07/09/2018 20* 7 - 17 mg/dL Final   • Creatinine 07/09/2018 1.31* 0.52 - 1.04 mg/dL Final   • Sodium 07/09/2018 142  137 - 145 mmol/L Final   • Potassium 07/09/2018  4.3  3.4 - 5.0 mmol/L Final   • Chloride 07/09/2018 109* 98 - 107 mmol/L Final   • CO2 07/09/2018 25.0  22.0 - 30.0 mmol/L Final   • Calcium 07/09/2018 9.2  8.4 - 10.2 mg/dL Final   • Total Protein 07/09/2018 8.0  6.3 - 8.2 g/dL Final   • Albumin 07/09/2018 4.60  3.50 - 5.00 g/dL Final   • ALT (SGPT) 07/09/2018 18  <=35 U/L Final   • AST (SGOT) 07/09/2018 25  14 - 36 U/L Final   • Alkaline Phosphatase 07/09/2018 49  38 - 126 U/L Final   • Total Bilirubin 07/09/2018 0.6  0.2 - 1.3 mg/dL Final   • eGFR Non African Amer 07/09/2018 40  39 - 90 mL/min/1.73 Final   • Globulin 07/09/2018 3.4  2.3 - 3.5 gm/dL Final   • A/G Ratio 07/09/2018 1.4  1.1 - 1.8 g/dL Final   • BUN/Creatinine Ratio 07/09/2018 15.3  7.0 - 25.0 Final   • Anion Gap 07/09/2018 8.0  5.0 - 15.0 mmol/L Final   • LDL Cholesterol  07/09/2018 113  1 - 129 mg/dL Final   • TSH 07/09/2018 3.910  0.460 - 4.680 mIU/mL Final   • Free T4 07/09/2018 1.16  0.78 - 2.19 ng/dL Final   ]